# Patient Record
Sex: MALE | Race: WHITE
[De-identification: names, ages, dates, MRNs, and addresses within clinical notes are randomized per-mention and may not be internally consistent; named-entity substitution may affect disease eponyms.]

---

## 2018-03-25 ENCOUNTER — HOSPITAL ENCOUNTER (OUTPATIENT)
Dept: HOSPITAL 62 - RAD | Age: 43
End: 2018-03-25
Attending: FAMILY MEDICINE
Payer: COMMERCIAL

## 2018-03-25 DIAGNOSIS — M54.2: Primary | ICD-10-CM

## 2018-03-25 PROCEDURE — 72141 MRI NECK SPINE W/O DYE: CPT

## 2018-03-25 NOTE — RADIOLOGY REPORT (SQ)
EXAM DESCRIPTION:  MRI CERVICAL SPINE WITHOUT



COMPLETED DATE/TIME:  3/25/2018 12:43 pm



REASON FOR STUDY:  CERVICALGIA M54.2  CERVICALGIA



COMPARISON:  None.



TECHNIQUE:  Sagittal and Axial imaging includes T1, T2, STIR and gradient echo sequences.



LIMITATIONS:  Not completed.  Claustrophobia and coughing.



FINDINGS:  ALIGNMENT: Normal.

VERTEBRAE: Intact.

BONE MARROW: Normal. No marrow replacement or reactive changes.

DISCS: Normal. No significant abnormal signal or loss of height.

HARDWARE: None in the spine.

CORD AND BASE OF BRAIN: Normal in size and signal intensity.

SOFT TISSUES: No soft tissue masses.

C1-C2: No significant spinal stenosis.

C2-C3: No significant spinal stenosis or exit foraminal stenosis.

C3-C4: Minimal disc osteophyte complex asymmetric right with mild narrowing of the right exit foramin
a.

C4-C5: Small disc osteophyte complex with mild narrowing of both exit foramina.

C5-C6: No significant spinal stenosis or exit foraminal stenosis.

C6-C7: No significant spinal stenosis or exit foraminal stenosis.

C7-T1: No significant spinal stenosis or exit foraminal stenosis.

UPPER THORACIC: Incompletely imaged. No significant spinal stenosis or exit foraminal stenosis.

OTHER: No other significant finding.



IMPRESSION:  Mild cervical spondylosis C3-4 and C4-5.

Limited study.



TECHNICAL DOCUMENTATION:  JOB ID:  [id number]

 2011 [employer]- All Rights Reserved



Reading location - IP/workstation name: [email or web address]